# Patient Record
Sex: FEMALE | Race: WHITE
[De-identification: names, ages, dates, MRNs, and addresses within clinical notes are randomized per-mention and may not be internally consistent; named-entity substitution may affect disease eponyms.]

---

## 2017-04-16 ENCOUNTER — HOSPITAL ENCOUNTER (EMERGENCY)
Dept: HOSPITAL 45 - C.EDB | Age: 62
Discharge: HOME | End: 2017-04-16
Payer: COMMERCIAL

## 2017-04-16 VITALS — OXYGEN SATURATION: 95 % | DIASTOLIC BLOOD PRESSURE: 69 MMHG | HEART RATE: 74 BPM | SYSTOLIC BLOOD PRESSURE: 123 MMHG

## 2017-04-16 VITALS — TEMPERATURE: 98.42 F

## 2017-04-16 VITALS
BODY MASS INDEX: 34.53 KG/M2 | WEIGHT: 194.89 LBS | BODY MASS INDEX: 34.53 KG/M2 | HEIGHT: 62.99 IN | WEIGHT: 194.89 LBS | HEIGHT: 62.99 IN

## 2017-04-16 DIAGNOSIS — Z82.49: ICD-10-CM

## 2017-04-16 DIAGNOSIS — Z79.899: ICD-10-CM

## 2017-04-16 DIAGNOSIS — Z79.82: ICD-10-CM

## 2017-04-16 DIAGNOSIS — Z83.6: ICD-10-CM

## 2017-04-16 DIAGNOSIS — Z83.3: ICD-10-CM

## 2017-04-16 DIAGNOSIS — G44.209: Primary | ICD-10-CM

## 2017-04-16 DIAGNOSIS — I10: ICD-10-CM

## 2017-04-16 NOTE — EMERGENCY ROOM VISIT NOTE
History


Report prepared by Neftali:  Herman Prakash


Under the Supervision of:  Dr. Leodan Piper D.O.


First contact with patient:  18:48


Chief Complaint:  HYPERTENSION


Stated Complaint:  HIGH BLOOD PRESSURE,BAD HEADACHE





History of Present Illness


The patient is a 62 year old female who presents to the Emergency Room with 

complaints of constant hypertension for the past two days. The patient states 

that she was having a headache, so she checked her blood pressure and it was 

high. She states that she went to work, and she checked it later, and it was 

still high. The patient states that she took a half of a pill of blood pressure 

medicine, and that seemed to help, however the headache came back, and she took 

another half of a pill, and it did not help today. The patient states that the 

last time she checked her blood pressure it was 168/92mmHg. The patient 

additionally states that her doctor recently changed her medications.





   Source of History:  patient


   Onset:  two days ago


   Position:  other (global)


   Quality:  other (hypertension)


   Timing:  constant


   Associated Symptoms:  + headache





Review of Systems


See HPI for pertinent positives & negatives. A total of 10 systems reviewed and 

were otherwise negative.





Past Medical & Surgical


Medical Problems:


(1) Hypertension








Family History





Cancer


Diabetes mellitus


Heart disease


Hypertension


Lung disease





Social History


Smoking Status:  Never Smoker


Alcohol Use:  occasionally


Marital Status:  single


Occupation Status:  employed





Current/Historical Medications


Scheduled


Aspirin (Aspirin Ec), 81 MG PO DAILY


Citalopram Hydrobromide (Citalopram Hydrobromide), 10 TAB PO DAILY





Allergies


Coded Allergies:  


     Penicillins (Unverified  Allergy, Mild, ., 4/16/17)


     Lisinopril (Verified  Allergy, Unknown, cough, 4/16/17)





Physical Exam


Vital Signs











  Date Time  Temp Pulse Resp B/P Pulse Ox O2 Delivery O2 Flow Rate FiO2


 


4/16/17 18:41 36.9 90 20 162/85 98 Room Air  











Physical Exam


CONSTITUTIONAL/VITAL SIGNS: Reviewed / noted above.


GENERAL: Non-toxic in appearance. 


INTEGUMENTARY: Warm, dry, and Pink.


HEAD: Normocephalic.


EYES: without scleral icterus or trauma.


ENT/OROPHARYNX: clear and moist.


LYMPHADENOPATHY/NECK: Is supple without lymphadenopathy or meningismus.


RESPIRATORY: Lungs clear and equal.


CARDIOVASCULAR: Regular rate and rhythm.


GI/ABDOMEN: Soft and nontender. No organomegaly or pulsatile mass. No rebound 

or guarding. Normal bowel sounds.


EXTREMITIES: Warm and well perfused.


BACK: No CVA tenderness.


NEUROLOGICAL: Intact without focal deficits. 


PSYCHIATRIC: normal affect.


MUSCULOSKELETAL: Normally developed with good muscle tone.





Medical Decision & Procedures


ER Provider


Diagnostic Interpretation:


Radiology results as stated below per my review and radiologist interpretation:





CT SCAN OF THE BRAIN WITHOUT IV CONTRAST





CLINICAL HISTORY: Headache.





COMPARISON STUDY:  CT of the brain dated 8/11/2014.





TECHNIQUE: Unenhanced axial CT scan of the brain is performed from the vertex to


the skull base. Automated dose control exposure was utilized.





CT DOSE: 537.48 mGy.cm





FINDINGS:





Brain parenchyma: The brain parenchyma is normal in appearance. There is no


hemorrhage, mass effect, or evidence of acute territorial ischemia by CT


criteria. Gray-white matter is preserved. No extra-axial fluid collection is


seen.





Ventricles, sulci, cisterns: There is mild atherosclerotic calcification of the


cavernous carotid arteries.





Intracranial vasculature: The visualized intracranial vasculature at the skull


base is normal in appearance.





Calvarium: Unremarkable.





Sinuses and mastoids: The visualized paranasal sinuses are clear. The mastoid


air cells are well pneumatized.





Orbits: The bony orbits are grossly intact.








IMPRESSION: No acute intracranial abnormality.











Electronically signed by:  Ted Hull M.D.


4/16/2017 7:40 PM





Dictated Date/Time:  4/16/2017 7:38 PM





Medications Administered











 Medications


  (Trade)  Dose


 Ordered  Sig/Cali


 Route  Start Time


 Stop Time Status Last Admin


Dose Admin


 


 Acetaminophen


  (Tylenol Tab)  1,000 mg  NOW  STAT


 PO  4/16/17 18:52


 4/16/17 18:53 DC 4/16/17 19:06


1,000 MG


 


 Ketorolac


 Tromethamine


  (Toradol Inj)  60 mg  NOW  STAT


 IM  4/16/17 18:52


 4/16/17 18:53 DC 4/16/17 19:06


60 MG











ED Course


1848: Previous medical records were reviewed. The patient was evaluated in room 

C6. A complete history and physical examination was performed.





1852: Toradol Inj 60mg IM, Tylenol Tab 1000mg PO





1950: On reevaluation, the patient is doing well. I discussed the results and 

findings with the patient. She verbalized agreement of the treatment plan. She 

was discharged home.





Medical Decision


Differential includes: Acute intracranial bleed, trauma, meningitis, 

encephalitis, increased intracranial pressure, mass or mass effect, facial or 

dental infection, temporal arteritis, CVA, TIA, acute hypertensive emergency, 

sinusitis, carbon monoxide exposure.





This is a 62-year-old female who presents to the ED with a chief complaint of a 

headache and concerns about hypertension.  The patient states that she's had 

the symptoms since Friday.  She's had a headache on Friday.  She states that 

she also noticed that her blood pressure was elevated.  It was in the 168/92 

range.  The patient states that she called her PCP and has an appointment to 

see her PCP on Tuesday.  She states that she took extra doses of blood pressure 

medication but it did not seem to help her blood pressure or headache.  She 

came in for evaluation.  She is currently taking losartan 100 mg daily and 

hydrochlorothiazide 25 mg daily.  The patient has not taken anything for her 

headache.  Her physical and neurologic exam were normal.  She was treated with 

Tylenol by mouth and Toradol IM.  CT scan of the brain did not show acute 

process.  Repeat blood pressure reveals 141/60.  The patient was told the 

results of the test.  She is felt to be stable for discharge.  I do not feel 

her blood pressure is causing her headaches.  The patient does report some 

stressors in her current living situation.  She likely has stress and tension 

headache causing her increase in her blood pressure.





Impression





 Primary Impression:  


 Tension headache


 Additional Impression:  


 Hypertension





Scribe Attestation


The scribe's documentation has been prepared under my direction and personally 

reviewed by me in its entirety. I confirm that the note above accurately 

reflects all work, treatment, procedures, and medical decision making performed 

by me.





Departure Information


Dispostion


Home / Self-Care





Referrals


Jose Miguel Li M.D. (PCP)





Patient Instructions


My [facility]





Additional Instructions





Take Tylenol or Motrin as needed for headache.





See your doctor on Tuesday for recheck.





Problem Qualifiers

## 2017-04-16 NOTE — DIAGNOSTIC IMAGING REPORT
CT SCAN OF THE BRAIN WITHOUT IV CONTRAST



CLINICAL HISTORY: Headache.



COMPARISON STUDY:  CT of the brain dated 8/11/2014.



TECHNIQUE: Unenhanced axial CT scan of the brain is performed from the vertex to

the skull base. Automated dose control exposure was utilized.



CT DOSE: 537.48 mGy.cm



FINDINGS:



Brain parenchyma: The brain parenchyma is normal in appearance. There is no

hemorrhage, mass effect, or evidence of acute territorial ischemia by CT

criteria. Gray-white matter is preserved. No extra-axial fluid collection is

seen.



Ventricles, sulci, cisterns: There is mild atherosclerotic calcification of the

cavernous carotid arteries.



Intracranial vasculature: The visualized intracranial vasculature at the skull

base is normal in appearance.



Calvarium: Unremarkable.



Sinuses and mastoids: The visualized paranasal sinuses are clear. The mastoid

air cells are well pneumatized.



Orbits: The bony orbits are grossly intact.





IMPRESSION: No acute intracranial abnormality.







Electronically signed by:  Ted Hull M.D.

4/16/2017 7:40 PM



Dictated Date/Time:  4/16/2017 7:38 PM

## 2017-07-11 ENCOUNTER — HOSPITAL ENCOUNTER (OUTPATIENT)
Dept: HOSPITAL 45 - C.NEUR | Age: 62
Discharge: HOME | End: 2017-07-11
Attending: INTERNAL MEDICINE
Payer: COMMERCIAL

## 2017-07-11 VITALS — SYSTOLIC BLOOD PRESSURE: 126 MMHG | DIASTOLIC BLOOD PRESSURE: 75 MMHG | HEART RATE: 97 BPM

## 2017-07-11 VITALS
HEIGHT: 62.99 IN | WEIGHT: 192.24 LBS | HEIGHT: 62.99 IN | WEIGHT: 192.24 LBS | BODY MASS INDEX: 34.06 KG/M2 | BODY MASS INDEX: 34.06 KG/M2

## 2017-07-11 DIAGNOSIS — G47.30: Primary | ICD-10-CM

## 2017-07-11 DIAGNOSIS — G47.33: ICD-10-CM

## 2017-07-11 DIAGNOSIS — Z99.89: ICD-10-CM

## 2017-08-17 ENCOUNTER — HOSPITAL ENCOUNTER (OUTPATIENT)
Dept: HOSPITAL 45 - C.LABBFT | Age: 62
Discharge: HOME | End: 2017-08-17
Attending: INTERNAL MEDICINE
Payer: COMMERCIAL

## 2017-08-17 DIAGNOSIS — E03.9: ICD-10-CM

## 2017-08-17 DIAGNOSIS — E78.5: ICD-10-CM

## 2017-08-17 DIAGNOSIS — E11.9: Primary | ICD-10-CM

## 2017-08-17 LAB
ALBUMIN/GLOB SERPL: 1 {RATIO} (ref 0.9–2)
ALP SERPL-CCNC: 78 U/L (ref 45–117)
ALT SERPL-CCNC: 28 U/L (ref 12–78)
ANION GAP SERPL CALC-SCNC: 6 MMOL/L (ref 3–11)
APPEARANCE UR: CLEAR
AST SERPL-CCNC: 21 U/L (ref 15–37)
BASOPHILS # BLD: 0.02 K/UL (ref 0–0.2)
BASOPHILS NFR BLD: 0.3 %
BILIRUB UR-MCNC: (no result) MG/DL
BUN SERPL-MCNC: 19 MG/DL (ref 7–18)
BUN/CREAT SERPL: 19.9 (ref 10–20)
CALCIUM SERPL-MCNC: 9.3 MG/DL (ref 8.5–10.1)
CHLORIDE SERPL-SCNC: 106 MMOL/L (ref 98–107)
CHOLEST/HDLC SERPL: 3.3 {RATIO}
CO2 SERPL-SCNC: 31 MMOL/L (ref 21–32)
COLOR UR: YELLOW
COMPLETE: YES
CREAT SERPL-MCNC: 0.96 MG/DL (ref 0.6–1.2)
CREAT UR-MCNC: 130 MG/DL
EOSINOPHIL NFR BLD AUTO: 276 K/UL (ref 130–400)
EST. AVERAGE GLUCOSE BLD GHB EST-MCNC: 128 MG/DL
GLOBULIN SER-MCNC: 4.1 GM/DL (ref 2.5–4)
GLUCOSE SERPL-MCNC: 98 MG/DL (ref 70–99)
GLUCOSE UR QL: 40 MG/DL
HCT VFR BLD CALC: 35.8 % (ref 37–47)
IG%: 0.4 %
IMM GRANULOCYTES NFR BLD AUTO: 38.9 %
KETONES UR QL STRIP: 48 MG/DL
LYMPHOCYTES # BLD: 3 K/UL (ref 1.2–3.4)
MANUAL MICROSCOPIC REQUIRED?: NO
MCH RBC QN AUTO: 30.2 PG (ref 25–34)
MCHC RBC AUTO-ENTMCNC: 32.1 G/DL (ref 32–36)
MCV RBC AUTO: 94 FL (ref 80–100)
MONOCYTES NFR BLD: 5.8 %
NEUTROPHILS # BLD AUTO: 2.6 %
NEUTROPHILS NFR BLD AUTO: 52 %
NITRITE UR QL STRIP: (no result)
NITRITE UR QL STRIP: 211 MG/DL (ref 0–150)
PH UR STRIP: 7.5 [PH] (ref 4.5–7.5)
PH UR: 130 MG/DL (ref 0–200)
PMV BLD AUTO: 10.2 FL (ref 7.4–10.4)
POTASSIUM SERPL-SCNC: 3.6 MMOL/L (ref 3.5–5.1)
RATIO: 4.2 MCG/MG (ref 0–30)
RBC # BLD AUTO: 3.81 M/UL (ref 4.2–5.4)
REVIEW REQ?: NO
SODIUM SERPL-SCNC: 143 MMOL/L (ref 136–145)
SP GR UR STRIP: 1.02 (ref 1–1.03)
TSH SERPL-ACNC: 2.21 UIU/ML (ref 0.3–4.5)
URINE EPITHELIAL CELL AUTO: >30 /LPF (ref 0–5)
UROBILINOGEN UR-MCNC: (no result) MG/DL
VERY LOW DENSITY LIPOPROT CALC: 42 MG/DL
WBC # BLD AUTO: 7.72 K/UL (ref 4.8–10.8)
ZZUR CULT IF INDIC CLEAN CATCH: NO

## 2017-10-11 ENCOUNTER — HOSPITAL ENCOUNTER (OUTPATIENT)
Dept: HOSPITAL 45 - C.LABBFT | Age: 62
Discharge: HOME | End: 2017-10-11
Attending: INTERNAL MEDICINE
Payer: COMMERCIAL

## 2017-10-11 DIAGNOSIS — D64.9: Primary | ICD-10-CM

## 2017-10-11 LAB
BASOPHILS # BLD: 0.02 K/UL (ref 0–0.2)
BASOPHILS NFR BLD: 0.3 %
COMPLETE: YES
EOSINOPHIL NFR BLD AUTO: 244 K/UL (ref 130–400)
HCT VFR BLD CALC: 32.2 % (ref 37–47)
IG%: 0.4 %
IMM GRANULOCYTES NFR BLD AUTO: 37.9 %
LYMPHOCYTES # BLD: 2.96 K/UL (ref 1.2–3.4)
MCH RBC QN AUTO: 30.6 PG (ref 25–34)
MCHC RBC AUTO-ENTMCNC: 33.5 G/DL (ref 32–36)
MCV RBC AUTO: 91.2 FL (ref 80–100)
MONOCYTES NFR BLD: 7 %
NEUTROPHILS # BLD AUTO: 3.3 %
NEUTROPHILS NFR BLD AUTO: 51.1 %
PMV BLD AUTO: 10 FL (ref 7.4–10.4)
RBC # BLD AUTO: 3.53 M/UL (ref 4.2–5.4)
WBC # BLD AUTO: 7.82 K/UL (ref 4.8–10.8)

## 2017-10-12 ENCOUNTER — HOSPITAL ENCOUNTER (OUTPATIENT)
Dept: HOSPITAL 45 - C.LABBFT | Age: 62
Discharge: HOME | End: 2017-10-12
Attending: INTERNAL MEDICINE
Payer: COMMERCIAL

## 2017-10-12 DIAGNOSIS — D64.9: Primary | ICD-10-CM

## 2017-10-12 LAB
FERRITIN SERPL-MCNC: 57.1 NG/ML (ref 8–388)
TIBC SERPL-MCNC: 307 MCG/DL (ref 250–450)

## 2017-10-16 ENCOUNTER — HOSPITAL ENCOUNTER (OUTPATIENT)
Dept: HOSPITAL 45 - C.LABBFT | Age: 62
Discharge: HOME | End: 2017-10-16
Attending: INTERNAL MEDICINE
Payer: COMMERCIAL

## 2017-10-16 DIAGNOSIS — D64.9: Primary | ICD-10-CM

## 2018-01-29 ENCOUNTER — HOSPITAL ENCOUNTER (OUTPATIENT)
Dept: HOSPITAL 45 - C.LABBFT | Age: 63
Discharge: HOME | End: 2018-01-29
Attending: PHYSICIAN ASSISTANT
Payer: COMMERCIAL

## 2018-01-29 DIAGNOSIS — D64.9: Primary | ICD-10-CM

## 2018-01-29 DIAGNOSIS — E78.5: ICD-10-CM

## 2018-01-29 DIAGNOSIS — E11.9: ICD-10-CM

## 2018-01-29 LAB
ALBUMIN SERPL-MCNC: 4 GM/DL (ref 3.4–5)
ALP SERPL-CCNC: 83 U/L (ref 45–117)
ALT SERPL-CCNC: 31 U/L (ref 12–78)
AST SERPL-CCNC: 18 U/L (ref 15–37)
BASOPHILS # BLD: 0.02 K/UL (ref 0–0.2)
BASOPHILS NFR BLD: 0.2 %
BUN SERPL-MCNC: 22 MG/DL (ref 7–18)
CALCIUM SERPL-MCNC: 9.3 MG/DL (ref 8.5–10.1)
CO2 SERPL-SCNC: 30 MMOL/L (ref 21–32)
CREAT SERPL-MCNC: 1.12 MG/DL (ref 0.6–1.2)
EOS ABS #: 0.19 K/UL (ref 0–0.5)
EOSINOPHIL NFR BLD AUTO: 261 K/UL (ref 130–400)
GLUCOSE SERPL-MCNC: 86 MG/DL (ref 70–99)
HBA1C MFR BLD: 6 % (ref 4.5–5.6)
HCT VFR BLD CALC: 36.1 % (ref 37–47)
HGB BLD-MCNC: 11.8 G/DL (ref 12–16)
IG#: 0.03 K/UL (ref 0–0.02)
IMM GRANULOCYTES NFR BLD AUTO: 38.5 %
KETONES UR QL STRIP: 45 MG/DL
LYMPHOCYTES # BLD: 3.23 K/UL (ref 1.2–3.4)
MCH RBC QN AUTO: 29.9 PG (ref 25–34)
MCHC RBC AUTO-ENTMCNC: 32.7 G/DL (ref 32–36)
MCV RBC AUTO: 91.6 FL (ref 80–100)
MONO ABS #: 0.56 K/UL (ref 0.11–0.59)
MONOCYTES NFR BLD: 6.7 %
NEUT ABS #: 4.35 K/UL (ref 1.4–6.5)
NEUTROPHILS # BLD AUTO: 2.3 %
NEUTROPHILS NFR BLD AUTO: 51.9 %
PH UR: 122 MG/DL (ref 0–200)
PMV BLD AUTO: 10.2 FL (ref 7.4–10.4)
POTASSIUM SERPL-SCNC: 3.6 MMOL/L (ref 3.5–5.1)
PROT SERPL-MCNC: 8.1 GM/DL (ref 6.4–8.2)
RED CELL DISTRIBUTION WIDTH CV: 13.8 % (ref 11.5–14.5)
RED CELL DISTRIBUTION WIDTH SD: 45.7 FL (ref 36.4–46.3)
SODIUM SERPL-SCNC: 139 MMOL/L (ref 136–145)
WBC # BLD AUTO: 8.38 K/UL (ref 4.8–10.8)

## 2018-03-27 ENCOUNTER — HOSPITAL ENCOUNTER (OUTPATIENT)
Dept: HOSPITAL 45 - C.NEUR | Age: 63
Discharge: HOME | End: 2018-03-27
Attending: PHYSICIAN ASSISTANT
Payer: COMMERCIAL

## 2018-03-27 VITALS
HEIGHT: 62.99 IN | BODY MASS INDEX: 33.98 KG/M2 | BODY MASS INDEX: 33.98 KG/M2 | WEIGHT: 191.8 LBS | WEIGHT: 191.8 LBS | HEIGHT: 62.99 IN

## 2018-03-27 VITALS — HEART RATE: 90 BPM | SYSTOLIC BLOOD PRESSURE: 136 MMHG | DIASTOLIC BLOOD PRESSURE: 73 MMHG

## 2018-03-27 DIAGNOSIS — Z99.89: ICD-10-CM

## 2018-03-27 DIAGNOSIS — E66.9: ICD-10-CM

## 2018-03-27 DIAGNOSIS — G47.33: Primary | ICD-10-CM
